# Patient Record
Sex: MALE | Race: WHITE | Employment: FULL TIME | URBAN - METROPOLITAN AREA
[De-identification: names, ages, dates, MRNs, and addresses within clinical notes are randomized per-mention and may not be internally consistent; named-entity substitution may affect disease eponyms.]

---

## 2022-02-02 ENCOUNTER — OFFICE VISIT (OUTPATIENT)
Dept: FAMILY MEDICINE CLINIC | Age: 42
End: 2022-02-02
Payer: COMMERCIAL

## 2022-02-02 VITALS
TEMPERATURE: 97.8 F | SYSTOLIC BLOOD PRESSURE: 108 MMHG | WEIGHT: 240 LBS | DIASTOLIC BLOOD PRESSURE: 66 MMHG | HEIGHT: 73 IN | HEART RATE: 74 BPM | OXYGEN SATURATION: 95 % | BODY MASS INDEX: 31.81 KG/M2

## 2022-02-02 DIAGNOSIS — F41.9 ANXIETY: ICD-10-CM

## 2022-02-02 DIAGNOSIS — Z00.00 PREVENTATIVE HEALTH CARE: Primary | ICD-10-CM

## 2022-02-02 DIAGNOSIS — Z13.1 SCREENING FOR DIABETES MELLITUS: ICD-10-CM

## 2022-02-02 DIAGNOSIS — Z13.220 SCREENING, LIPID: ICD-10-CM

## 2022-02-02 PROCEDURE — 99386 PREV VISIT NEW AGE 40-64: CPT | Performed by: PHYSICIAN ASSISTANT

## 2022-02-02 RX ORDER — FLUOXETINE HYDROCHLORIDE 20 MG/1
CAPSULE ORAL
COMMUNITY
Start: 2022-02-01 | End: 2022-02-03 | Stop reason: SDUPTHER

## 2022-02-02 SDOH — ECONOMIC STABILITY: FOOD INSECURITY: WITHIN THE PAST 12 MONTHS, YOU WORRIED THAT YOUR FOOD WOULD RUN OUT BEFORE YOU GOT MONEY TO BUY MORE.: NEVER TRUE

## 2022-02-02 SDOH — ECONOMIC STABILITY: FOOD INSECURITY: WITHIN THE PAST 12 MONTHS, THE FOOD YOU BOUGHT JUST DIDN'T LAST AND YOU DIDN'T HAVE MONEY TO GET MORE.: NEVER TRUE

## 2022-02-02 ASSESSMENT — ENCOUNTER SYMPTOMS
DIARRHEA: 0
CHEST TIGHTNESS: 0
ABDOMINAL PAIN: 0
BACK PAIN: 0
CONSTIPATION: 0
EYE PAIN: 0
TROUBLE SWALLOWING: 0
COUGH: 0
SORE THROAT: 0
VOICE CHANGE: 0
SHORTNESS OF BREATH: 0

## 2022-02-02 ASSESSMENT — PATIENT HEALTH QUESTIONNAIRE - PHQ9
SUM OF ALL RESPONSES TO PHQ QUESTIONS 1-9: 0
2. FEELING DOWN, DEPRESSED OR HOPELESS: 0
SUM OF ALL RESPONSES TO PHQ QUESTIONS 1-9: 0
SUM OF ALL RESPONSES TO PHQ9 QUESTIONS 1 & 2: 0
DEPRESSION UNABLE TO ASSESS: FUNCTIONAL CAPACITY MOTIVATION LIMITS ACCURACY
1. LITTLE INTEREST OR PLEASURE IN DOING THINGS: 0
SUM OF ALL RESPONSES TO PHQ QUESTIONS 1-9: 0
SUM OF ALL RESPONSES TO PHQ QUESTIONS 1-9: 0

## 2022-02-02 ASSESSMENT — SOCIAL DETERMINANTS OF HEALTH (SDOH): HOW HARD IS IT FOR YOU TO PAY FOR THE VERY BASICS LIKE FOOD, HOUSING, MEDICAL CARE, AND HEATING?: NOT HARD AT ALL

## 2022-02-02 NOTE — PROGRESS NOTES
Subjective:      Patient ID: Mukesh Garrett is a 35 y.o. male. HPI Patient is here today for a new patient visit to establish care. His last doctor retired so he needed a new doctor. She retired in 2020. He takes Prozac 20mg daily for anxiety and has for 12 years. Works well for him and has no SE's. He has had 3 Covid vaccines. He had a flu shot. Last Tdap in 2017. He sleeps well. He has a good appetite. He exercises 6 days a week. He has no bowel/bladder issues. He has not had labs in a couple years. Review of Systems   Constitutional: Negative for appetite change, fatigue and unexpected weight change. HENT: Negative for congestion, dental problem, ear pain, hearing loss, sore throat, trouble swallowing and voice change. Eyes: Negative for pain and visual disturbance. Respiratory: Negative for cough, chest tightness and shortness of breath. Cardiovascular: Negative for chest pain and palpitations. Gastrointestinal: Negative for abdominal pain, constipation and diarrhea. Genitourinary: Negative for difficulty urinating. Musculoskeletal: Negative for arthralgias, back pain, myalgias and neck pain. Skin: Negative for rash. Neurological: Negative for dizziness, speech difficulty, weakness, numbness and headaches. Hematological: Negative for adenopathy. Psychiatric/Behavioral: Negative for confusion and sleep disturbance. The patient is not nervous/anxious. Objective:   Physical Exam  Vitals reviewed. Constitutional:       Appearance: Normal appearance. He is well-developed and well-groomed. HENT:      Head: Normocephalic. Right Ear: Tympanic membrane and ear canal normal.      Left Ear: Tympanic membrane and ear canal normal.      Nose: Nose normal.      Mouth/Throat:      Pharynx: Oropharynx is clear. Uvula midline. Eyes:      Conjunctiva/sclera: Conjunctivae normal.      Pupils: Pupils are equal, round, and reactive to light.    Neck:      Thyroid: No thyroid mass or thyromegaly. Trachea: Trachea normal.   Cardiovascular:      Rate and Rhythm: Normal rate and regular rhythm. Chest Wall: PMI is not displaced. Pulses: Normal pulses. Heart sounds: Normal heart sounds, S1 normal and S2 normal.   Pulmonary:      Effort: Pulmonary effort is normal.      Breath sounds: Normal breath sounds. Abdominal:      General: Abdomen is flat. Bowel sounds are normal.      Palpations: Abdomen is soft. Tenderness: There is no abdominal tenderness. There is no guarding or rebound. Hernia: No hernia is present. Musculoskeletal:      Cervical back: Normal range of motion and neck supple. Thoracic back: Normal.      Lumbar back: Normal.      Right hip: Normal.      Left hip: Normal.      Comments: Full ROM and strength of torso and extremities, gait normal   Lymphadenopathy:      Cervical: No cervical adenopathy. Skin:     General: Skin is warm and dry. Findings: No rash. Neurological:      Mental Status: He is alert and oriented to person, place, and time. Cranial Nerves: No cranial nerve deficit. Sensory: No sensory deficit. Gait: Gait normal.   Psychiatric:         Attention and Perception: Attention normal.         Mood and Affect: Mood normal.         Speech: Speech normal.         Behavior: Behavior normal. Behavior is cooperative. Assessment:      Filemon Quintanilla was seen today for new patient. Diagnoses and all orders for this visit:    Preventative health care    Anxiety    Screening, lipid  -     LIPID PANEL; Future    Screening for diabetes mellitus  -     Comprehensive Metabolic Panel             Plan:      Get routine labs, stable anxiety, return here in a year or sooner if needed.          Rufina Merlin, [place]

## 2022-02-02 NOTE — PATIENT INSTRUCTIONS
Thank you for enrolling in [address] Please follow the instructions below to securely access your online medical record. [employer] allows you to send messages to your doctor, view your test results, renew your prescriptions, schedule appointments, and more. How Do I Sign Up? 1. In your Internet browser, go to [email or web address]  2. Click on the Sign Up Now link in the Sign In box. You will see the New Member Sign Up page. 3. Enter your [employer] Access Code exactly as it appears below. You will not need to use this code after youve completed the sign-up process. If you do not sign up before the expiration date, you must request a new code. [employer] Access Code: [email or web address]  Expires: 3/18/2022 11:08 AM    4. Enter your Social Security Number ([id number]) and Date of Birth (mm/dd/yyyy) as indicated and click Submit. You will be taken to the next sign-up page. 5. Create a [employer] ID. This will be your [employer] login ID and cannot be changed, so think of one that is secure and easy to remember. 6. Create a [employer] password. You can change your password at any time. 7. Enter your Password Reset Question and Answer. This can be used at a later time if you forget your password. 8. Enter your e-mail address. You will receive e-mail notification when new information is available in [address] 9. Click Sign Up. You can now view your medical record. Additional Information  If you have questions, please contact your physician practice where you receive care. Remember, [employer] is NOT to be used for urgent needs. For medical emergencies, dial 911. Martita Salamanca was seen today for new patient. Diagnoses and all orders for this visit:    Preventative health care    Anxiety    Screening, lipid  -     LIPID PANEL; Future    Screening for diabetes mellitus  -     Comprehensive Metabolic Panel       Get routine labs, return here in a year or sooner if needed.

## 2022-02-03 ENCOUNTER — PATIENT MESSAGE (OUTPATIENT)
Dept: FAMILY MEDICINE CLINIC | Age: 42
End: 2022-02-03

## 2022-02-03 DIAGNOSIS — F41.9 ANXIETY: Primary | ICD-10-CM

## 2022-02-03 RX ORDER — FLUOXETINE HYDROCHLORIDE 20 MG/1
20 CAPSULE ORAL DAILY
Qty: 90 CAPSULE | Refills: 3 | Status: SHIPPED | OUTPATIENT
Start: 2022-02-03

## 2022-02-24 ENCOUNTER — TELEPHONE (OUTPATIENT)
Dept: FAMILY MEDICINE CLINIC | Age: 42
End: 2022-02-24

## 2022-02-24 NOTE — TELEPHONE ENCOUNTER
Patient is calling because he is going to get his labs done and he wants to know if he can get a test for his his thyroid ordered and also the pre diabetes test ordered. He would like a call when that is ordered.       Please advise       Provider out of the office

## 2022-02-25 ENCOUNTER — TELEPHONE (OUTPATIENT)
Dept: FAMILY MEDICINE CLINIC | Age: 42
End: 2022-02-25

## 2022-02-25 ENCOUNTER — HOSPITAL ENCOUNTER (OUTPATIENT)
Age: 42
Discharge: HOME | End: 2022-02-25
Payer: COMMERCIAL

## 2022-02-25 DIAGNOSIS — I10 HYPERTENSION, UNSPECIFIED TYPE: ICD-10-CM

## 2022-02-25 DIAGNOSIS — R79.89 ABNORMAL THYROID BLOOD TEST: ICD-10-CM

## 2022-02-25 DIAGNOSIS — R79.89 ABNORMAL THYROID BLOOD TEST: Primary | ICD-10-CM

## 2022-02-25 DIAGNOSIS — Z13.220 SCREENING, LIPID: ICD-10-CM

## 2022-02-25 LAB
A/G RATIO: 2.1 (ref 1.1–2.2)
ALBUMIN SERPL-MCNC: 4.5 G/DL (ref 3.4–5)
ALP BLD-CCNC: 52 U/L (ref 40–129)
ALT SERPL-CCNC: 20 U/L (ref 10–40)
ANION GAP SERPL CALCULATED.3IONS-SCNC: 9 MMOL/L (ref 3–16)
AST SERPL-CCNC: 21 U/L (ref 15–37)
BASOPHILS ABSOLUTE: 0 K/UL (ref 0–0.2)
BASOPHILS RELATIVE PERCENT: 0.5 %
BILIRUB SERPL-MCNC: 1.2 MG/DL (ref 0–1)
BUN BLDV-MCNC: 18 MG/DL (ref 7–20)
CALCIUM SERPL-MCNC: 9.8 MG/DL (ref 8.3–10.6)
CHLORIDE BLD-SCNC: 102 MMOL/L (ref 99–110)
CHOLESTEROL, TOTAL: 251 MG/DL (ref 0–199)
CO2: 27 MMOL/L (ref 21–32)
CREAT SERPL-MCNC: 1.2 MG/DL (ref 0.9–1.3)
EOSINOPHILS ABSOLUTE: 0.1 K/UL (ref 0–0.6)
EOSINOPHILS RELATIVE PERCENT: 2.5 %
GFR AFRICAN AMERICAN: >60
GFR NON-AFRICAN AMERICAN: >60
GLUCOSE BLD-MCNC: 87 MG/DL (ref 70–99)
HCT VFR BLD CALC: 46.3 % (ref 40.5–52.5)
HDLC SERPL-MCNC: 40 MG/DL (ref 40–60)
HEMOGLOBIN: 16 G/DL (ref 13.5–17.5)
LDL CHOLESTEROL CALCULATED: 187 MG/DL
LYMPHOCYTES ABSOLUTE: 1.7 K/UL (ref 1–5.1)
LYMPHOCYTES RELATIVE PERCENT: 33 %
MCH RBC QN AUTO: 29.2 PG (ref 26–34)
MCHC RBC AUTO-ENTMCNC: 34.4 G/DL (ref 31–36)
MCV RBC AUTO: 84.7 FL (ref 80–100)
MONOCYTES ABSOLUTE: 0.3 K/UL (ref 0–1.3)
MONOCYTES RELATIVE PERCENT: 6.6 %
NEUTROPHILS ABSOLUTE: 2.9 K/UL (ref 1.7–7.7)
NEUTROPHILS RELATIVE PERCENT: 57.4 %
PDW BLD-RTO: 13.4 % (ref 12.4–15.4)
PLATELET # BLD: 253 K/UL (ref 135–450)
PMV BLD AUTO: 7.3 FL (ref 5–10.5)
POTASSIUM SERPL-SCNC: 4.6 MMOL/L (ref 3.5–5.1)
RBC # BLD: 5.47 M/UL (ref 4.2–5.9)
SODIUM BLD-SCNC: 138 MMOL/L (ref 136–145)
TOTAL PROTEIN: 6.6 G/DL (ref 6.4–8.2)
TRIGL SERPL-MCNC: 120 MG/DL (ref 0–150)
TSH REFLEX: 1.87 UIU/ML (ref 0.27–4.2)
VLDLC SERPL CALC-MCNC: 24 MG/DL
WBC # BLD: 5.1 K/UL (ref 4–11)

## 2022-02-25 PROCEDURE — 80053 COMPREHEN METABOLIC PANEL: CPT

## 2022-02-25 PROCEDURE — 80061 LIPID PANEL: CPT

## 2022-02-25 PROCEDURE — 84443 ASSAY THYROID STIM HORMONE: CPT

## 2022-02-25 PROCEDURE — 85025 COMPLETE CBC W/AUTO DIFF WBC: CPT

## 2022-02-25 PROCEDURE — 36415 COLL VENOUS BLD VENIPUNCTURE: CPT

## 2022-03-01 RX ORDER — ATORVASTATIN CALCIUM 20 MG/1
20 TABLET, FILM COATED ORAL DAILY
Qty: 90 TABLET | Refills: 1 | Status: SHIPPED | OUTPATIENT
Start: 2022-03-01 | End: 2022-08-01 | Stop reason: SDUPTHER

## 2022-07-06 ENCOUNTER — TELEPHONE (OUTPATIENT)
Dept: FAMILY MEDICINE CLINIC | Age: 42
End: 2022-07-06

## 2022-07-06 NOTE — TELEPHONE ENCOUNTER
Patient is calling stating that he has a few questions about testing positive for COVID on 06/21. He also needs a refill on this medication. atorvastatin (LIPITOR) 20 MG tablet 90 tablet 1 3/1/2022     Sig - Route:  Take 1 tablet by mouth daily - Oral      [facility] [address] in chart     [facility] patient     Asked for Jane Boone

## 2022-07-06 NOTE — TELEPHONE ENCOUNTER
Called and spoke with patient- states that he tested positive for Covid on June 21st- had aches, lost of taste and smell. Used an at home test. Patient is taking a cruise beginning of August and will be needing a negative COVID test to travel however he states that he can test positive for awhile after having it and was wondering if a letter could be written stating that he will be okay to travel. Advised patient that I would forward this to a provider. Please advise. Refill for atorvastatin not sent at this time. Per patient spilled about have a bottle of medication down the sink but has about two weeks left. Advised patient that insurance will not cover if it is refilled to early. Patient will call back when he is almost out to see if insurance will cover- did advise of good RX as well.

## 2022-07-06 NOTE — TELEPHONE ENCOUNTER
Made follow up phone call to patient. Did take home test that was positive with symptoms starting on 6/21. Will send picture confirmation via [employer] Advised patient to use saline rinses for 3 days then retest at home - if negative we can PCR test him here.   Otherwise I can write him a letter with confirmation of home test.

## 2022-08-01 ENCOUNTER — TELEPHONE (OUTPATIENT)
Dept: FAMILY MEDICINE CLINIC | Age: 42
End: 2022-08-01

## 2022-08-01 RX ORDER — ATORVASTATIN CALCIUM 20 MG/1
20 TABLET, FILM COATED ORAL DAILY
Qty: 90 TABLET | Refills: 0 | Status: SHIPPED | OUTPATIENT
Start: 2022-08-01 | End: 2022-10-31

## 2022-10-31 RX ORDER — ATORVASTATIN CALCIUM 20 MG/1
TABLET, FILM COATED ORAL
Qty: 90 TABLET | Refills: 0 | Status: SHIPPED | OUTPATIENT
Start: 2022-10-31

## 2022-10-31 NOTE — TELEPHONE ENCOUNTER
Former AJ pt         Medication:   Requested Prescriptions     Pending Prescriptions Disp Refills    atorvastatin (LIPITOR) 20 MG tablet [Pharmacy Med Name: ATORVASTATIN 20 MG TABLET] 90 tablet 0     Sig: TAKE 1 TABLET BY MOUTH EVERY DAY      Last Filled:      Patient Phone Number: [phone] (home)     Last appt: 2/2/2022   Next appt: Visit date not found    Last OARRS: No flowsheet data found.   PDMP Monitoring:    Last PDMP Tara nowak Reviewed [facility]):  Review User Review Instant Review Result          Preferred Pharmacy:   Mayra Baez [address] [place] [address] [phone]  [address] [place] [address]  Phone: [phone] Fax: [phone]

## 2023-01-30 RX ORDER — ATORVASTATIN CALCIUM 20 MG/1
TABLET, FILM COATED ORAL
Qty: 30 TABLET | Refills: 0 | OUTPATIENT
Start: 2023-01-30

## 2023-01-30 RX ORDER — ATORVASTATIN CALCIUM 20 MG/1
TABLET, FILM COATED ORAL
Qty: 90 TABLET | Refills: 0 | OUTPATIENT
Start: 2023-01-30

## 2023-01-30 NOTE — TELEPHONE ENCOUNTER
Medication:   Requested Prescriptions     Pending Prescriptions Disp Refills    atorvastatin (LIPITOR) 20 MG tablet [Pharmacy Med Name: ATORVASTATIN 20 MG TABLET] 30 tablet 0     Sig: TAKE 1 TABLET BY MOUTH EVERY DAY      Last Filled:      Patient Phone Number: [phone] (home)     Last appt: 2/2/2022   Next appt: Visit date not found    Last OARRS: No flowsheet data found.   PDMP Monitoring:    Last PDMP John nowak Reviewed [facility]):  Review User Review Instant Review Result          Preferred Pharmacy:   [facility]aez [phone]  [address] [place] [id number]  Phone: [phone] Fax: [phone]

## 2023-02-07 RX ORDER — ATORVASTATIN CALCIUM 20 MG/1
20 TABLET, FILM COATED ORAL DAILY
Qty: 30 TABLET | Refills: 0 | Status: SHIPPED | OUTPATIENT
Start: 2023-02-07

## 2023-02-15 DIAGNOSIS — F41.9 ANXIETY: ICD-10-CM

## 2023-02-16 ENCOUNTER — OFFICE VISIT (OUTPATIENT)
Dept: FAMILY MEDICINE CLINIC | Age: 43
End: 2023-02-16
Payer: COMMERCIAL

## 2023-02-16 VITALS
OXYGEN SATURATION: 98 % | HEART RATE: 76 BPM | DIASTOLIC BLOOD PRESSURE: 82 MMHG | SYSTOLIC BLOOD PRESSURE: 118 MMHG | WEIGHT: 245 LBS | TEMPERATURE: 97.3 F | BODY MASS INDEX: 32.32 KG/M2

## 2023-02-16 DIAGNOSIS — Z00.00 ANNUAL PHYSICAL EXAM: Primary | ICD-10-CM

## 2023-02-16 DIAGNOSIS — E78.2 MIXED HYPERLIPIDEMIA: ICD-10-CM

## 2023-02-16 DIAGNOSIS — F41.9 ANXIETY: ICD-10-CM

## 2023-02-16 PROCEDURE — 99396 PREV VISIT EST AGE 40-64: CPT | Performed by: NURSE PRACTITIONER

## 2023-02-16 RX ORDER — FLUOXETINE HYDROCHLORIDE 20 MG/1
CAPSULE ORAL
Qty: 90 CAPSULE | Refills: 3 | OUTPATIENT
Start: 2023-02-16

## 2023-02-16 RX ORDER — FLUOXETINE HYDROCHLORIDE 20 MG/1
20 CAPSULE ORAL DAILY
Qty: 90 CAPSULE | Refills: 3 | Status: SHIPPED | OUTPATIENT
Start: 2023-02-16

## 2023-02-16 SDOH — HEALTH STABILITY: PHYSICAL HEALTH: ON AVERAGE, HOW MANY DAYS PER WEEK DO YOU ENGAGE IN MODERATE TO STRENUOUS EXERCISE (LIKE A BRISK WALK)?: 6 DAYS

## 2023-02-16 SDOH — HEALTH STABILITY: PHYSICAL HEALTH: ON AVERAGE, HOW MANY MINUTES DO YOU ENGAGE IN EXERCISE AT THIS LEVEL?: 90 MIN

## 2023-02-16 SDOH — ECONOMIC STABILITY: INCOME INSECURITY: HOW HARD IS IT FOR YOU TO PAY FOR THE VERY BASICS LIKE FOOD, HOUSING, MEDICAL CARE, AND HEATING?: NOT HARD AT ALL

## 2023-02-16 SDOH — ECONOMIC STABILITY: FOOD INSECURITY: WITHIN THE PAST 12 MONTHS, THE FOOD YOU BOUGHT JUST DIDN'T LAST AND YOU DIDN'T HAVE MONEY TO GET MORE.: NEVER TRUE

## 2023-02-16 SDOH — ECONOMIC STABILITY: HOUSING INSECURITY
IN THE LAST 12 MONTHS, WAS THERE A TIME WHEN YOU DID NOT HAVE A STEADY PLACE TO SLEEP OR SLEPT IN A SHELTER (INCLUDING NOW)?: NO

## 2023-02-16 SDOH — ECONOMIC STABILITY: FOOD INSECURITY: WITHIN THE PAST 12 MONTHS, YOU WORRIED THAT YOUR FOOD WOULD RUN OUT BEFORE YOU GOT MONEY TO BUY MORE.: NEVER TRUE

## 2023-02-16 ASSESSMENT — PATIENT HEALTH QUESTIONNAIRE - PHQ9
SUM OF ALL RESPONSES TO PHQ QUESTIONS 1-9: 0
SUM OF ALL RESPONSES TO PHQ9 QUESTIONS 1 & 2: 0
2. FEELING DOWN, DEPRESSED OR HOPELESS: 0
SUM OF ALL RESPONSES TO PHQ QUESTIONS 1-9: 0
SUM OF ALL RESPONSES TO PHQ QUESTIONS 1-9: 0
1. LITTLE INTEREST OR PLEASURE IN DOING THINGS: 0
SUM OF ALL RESPONSES TO PHQ QUESTIONS 1-9: 0

## 2023-02-16 NOTE — PROGRESS NOTES
History and Physical      Kendrick Summers  YOB: 1980    Date of Service:  2/16/2023    Chief Complaint:   Kendrick Summers is a 38 y.o. male who presents for complete physical examination. HPI: Presents to clinic today for annual physical exam and follow up on chronic health conditions. HLD  Started on Lipitor last year. Tolerating well. No side effects. Father, maternal uncle treated for high cholesterol. Paternal grandfather - MI/Stroke - unsure of age, was also treated for cholesterol - believes his first coronary event was in his 62s - he was generally unhealthy - smoker/drinker. Anxiety  Stable. Compliant with medications. Denies any side effects. Hemorrhoids  Does still have some rectal bleeding on occasion. Has had internal hemorrhoids banded. Diet: Good - eats fruits and vegetables daily. Does try to limit junk and sugar. Struggles with portion control. Exercise: 6 times per week - cardio/weights  Occupation: [employer] -  - full time, likes job, likes co-workers in general.   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 Family life: , 2 children, no pets, lives in house. Medium stress - home stress. No concerns for anxiety/depression. Last eye exam: 2019 - Lasix. Last dental exam: 2023    Preventive Care:  Health Maintenance   Topic Date Due    HIV screen  Never done    Hepatitis C screen  Never done    COVID-19 Vaccine (3 - Booster for Pfizer series) 12/28/2021    Flu vaccine (1) 08/01/2022    Depression Screen  02/02/2023    Lipids  02/25/2023    DTaP/Tdap/Td vaccine (3 - Td or Tdap) 07/21/2027    Hepatitis A vaccine  Aged Out    Hib vaccine  Aged Out    Meningococcal (ACWY) vaccine  Aged Out    Pneumococcal 0-64 years Vaccine  Aged Out        Family History:  Colon Cancer: None  Thyroid Cancer/Disease: None  Melanoma: Maternal grandfather - unsure of age of dx.    Prostate Cancer: None  Testicular Cancer: None       Preventive plan of care for Eula Hernandez        2/16/2023           Preventive Measures Status       Recommendations for screening   Prostate Cancer Screen  No results found for: PSA   This test is not clinically indicated    Colon Cancer Screen  Last colonoscopy: N/A This test is not clinically indicated   Diabetes Screen  Glucose (mg/dL)   Date Value   02/25/2022 87    Repeat yearly   Cholesterol Screen  Lab Results   Component Value Date    CHOL 251 (H) 02/25/2022    TRIG 120 02/25/2022    HDL 40 02/25/2022    LDLCALC 187 (H) 02/25/2022    Repeat yearly   Aspirin for Cardiovascular Prevention   No Not indicated   Weight: Body mass index is 32.32 kg/m². 245 lb (111.1 kg)  Your BMI is 25 or greater, which indicates that you are overweight   Living Will: Yes Copy requested    Recommended Immunizations    Immunization History   Administered Date(s) Administered    COVID-19, PFIZER PURPLE top, DILUTE for use, (age 15 y+), 30mcg/0.3mL 03/20/2021, 04/13/2021, 11/02/2021    Influenza vaccine:  recommended every fall              Wt Readings from Last 3 Encounters:   02/16/23 245 lb (111.1 kg)   02/02/22 240 lb (108.9 kg)     BP Readings from Last 3 Encounters:   02/16/23 118/82   02/02/22 108/66       Patient Active Problem List   Diagnosis    Anxiety       Allergies   Allergen Reactions    Aminobenzoate Rash    Macadamia Nut Oil Rash     Outpatient Medications Marked as Taking for the 2/16/23 encounter (Office Visit) with ROSALINDA Mccabe CNP   Medication Sig Dispense Refill    FLUoxetine (PROZAC) 20 MG capsule Take 1 capsule by mouth daily 90 capsule 3    atorvastatin (LIPITOR) 20 MG tablet Take 1 tablet by mouth daily 30 tablet 0       No past medical history on file.   Past Surgical History:   Procedure Laterality Date    EYE SURGERY      lasik     Family History   Problem Relation Age of Onset    Diabetes Maternal Grandmother     Cancer Paternal Grandmother         lung-smoker    Heart Disease Paternal Grandfather     High Blood Pressure Paternal Grandfather      Social History     Socioeconomic History    Marital status:      Spouse name: Not on file    Number of children: Not on file    Years of education: Not on file    Highest education level: Not on file   Occupational History    Not on file   Tobacco Use    Smoking status: Never    Smokeless tobacco: Never   Substance and Sexual Activity    Alcohol use: Yes     Alcohol/week: 2.0 standard drinks     Types: 2 Cans of beer per week     Comment: social    Drug use: Never    Sexual activity: Not on file   Other Topics Concern    Not on file   Social History Narrative    Not on file     Social Determinants of Health     Financial Resource Strain: Low Risk     Difficulty of Paying Living Expenses: Not hard at all   Food Insecurity: No Food Insecurity    Worried About [address] in the Last Year: Never true    [address] [employer] in the Last Year: Never true   Transportation Needs: Unknown    Lack of Transportation (Medical): Not on file    Lack of Transportation (Non-Medical): No   Physical Activity: Sufficiently Active    Days of Exercise per Week: 6 days    Minutes of Exercise per Session: 90 min   Stress: Not on file   Social Connections: Not on file   Intimate Partner Violence: Not At Risk    Fear of Current or Ex-Partner: No    Emotionally Abused: No    Physically Abused: No    Sexually Abused: No   Housing Stability: Unknown    Unable to Pay for Housing in the Last Year: Not on file    Number of Places Lived in the Last Year: Not on file    Unstable Housing in the Last Year: No     Review of Systems:  A comprehensive review of systems was negative except for what was noted in the HPI. Physical Exam:   Vitals:    02/16/23 1337   BP: 118/82   Site: Left Upper Arm   Position: Sitting   Cuff Size: Large Adult   Pulse: 76   Temp: 97.3 °F (36.3 °C)   SpO2: 98%   Weight: 245 lb (111.1 kg)     Body mass index is 32.32 kg/m².    Physical Exam  Constitutional:       General: He is not in acute distress. Appearance: Normal appearance. He is well-developed. HENT:      Head: Normocephalic and atraumatic. Right Ear: Hearing, tympanic membrane, ear canal and external ear normal.      Left Ear: Hearing, tympanic membrane, ear canal and external ear normal.      Nose: Nose normal. No mucosal edema. Mouth/Throat:      Pharynx: Uvula midline. Tonsils: No tonsillar exudate. 1+ on the right. 1+ on the left. Eyes:      General: Lids are normal.         Right eye: No discharge. Left eye: No discharge. Conjunctiva/sclera: Conjunctivae normal.      Pupils: Pupils are equal, round, and reactive to light. Neck:      Thyroid: No thyromegaly. Trachea: Trachea normal. No tracheal deviation. Cardiovascular:      Rate and Rhythm: Normal rate and regular rhythm. Heart sounds: Normal heart sounds, S1 normal and S2 normal.   Pulmonary:      Effort: Pulmonary effort is normal. No respiratory distress. Breath sounds: Normal breath sounds. Abdominal:      General: Bowel sounds are normal. There is no distension. Palpations: Abdomen is soft. Tenderness: There is no abdominal tenderness. There is no guarding. Musculoskeletal:         General: Normal range of motion. Cervical back: Normal range of motion. Lymphadenopathy:      Cervical: No cervical adenopathy. Skin:     General: Skin is warm and dry. Neurological:      Mental Status: He is alert and oriented to person, place, and time. Psychiatric:         Thought Content: Thought content normal.         Judgment: Judgment normal.      Assessment/Plan:    Other Recommendations   See a dentist every 6 months  Try to get at least 30 minutes of exercise 3-4 days per week  Always wear a seat belt when traveling in a car       1. Annual physical exam  Encouraged continued healthy diet and regular exercise. Due for routine labs. Follow up in 1 year for annual exam - sooner if needed.    - Comprehensive Metabolic Panel, Fasting; Future  - Lipid, Fasting; Future    2. Anxiety  Stable. Continue on current medication regimen. - Comprehensive Metabolic Panel, Fasting; Future  - FLUoxetine (PROZAC) 20 MG capsule; Take 1 capsule by mouth daily  Dispense: 90 capsule; Refill: 3    3. Mixed hyperlipidemia  Complete routine blood work. Will adjust treatment plan if needed. - Lipid, Fasting; Future  Return in about 1 year (around 2/16/2024) for annual exam, chronic health conditions.

## 2023-02-22 ENCOUNTER — HOSPITAL ENCOUNTER (OUTPATIENT)
Age: 43
Discharge: HOME | End: 2023-02-22
Payer: COMMERCIAL

## 2023-02-22 ENCOUNTER — PATIENT MESSAGE (OUTPATIENT)
Dept: FAMILY MEDICINE CLINIC | Age: 43
End: 2023-02-22

## 2023-02-22 DIAGNOSIS — F41.9 ANXIETY: ICD-10-CM

## 2023-02-22 DIAGNOSIS — Z00.00 ANNUAL PHYSICAL EXAM: ICD-10-CM

## 2023-02-22 DIAGNOSIS — E78.2 MIXED HYPERLIPIDEMIA: ICD-10-CM

## 2023-02-22 LAB
A/G RATIO: 1.8 (ref 1.1–2.2)
ALBUMIN SERPL-MCNC: 4.5 G/DL (ref 3.4–5)
ALP BLD-CCNC: 65 U/L (ref 40–129)
ALT SERPL-CCNC: 33 U/L (ref 10–40)
ANION GAP SERPL CALCULATED.3IONS-SCNC: 7 MMOL/L (ref 3–16)
AST SERPL-CCNC: 26 U/L (ref 15–37)
BILIRUB SERPL-MCNC: 0.8 MG/DL (ref 0–1)
BUN BLDV-MCNC: 20 MG/DL (ref 7–20)
CALCIUM SERPL-MCNC: 9.6 MG/DL (ref 8.3–10.6)
CHLORIDE BLD-SCNC: 106 MMOL/L (ref 99–110)
CHOLESTEROL, FASTING: 157 MG/DL (ref 0–199)
CO2: 27 MMOL/L (ref 21–32)
CREAT SERPL-MCNC: 1.1 MG/DL (ref 0.9–1.3)
GFR SERPL CREATININE-BSD FRML MDRD: >60 ML/MIN/{1.73_M2}
GLUCOSE FASTING: 99 MG/DL (ref 70–99)
HDLC SERPL-MCNC: 44 MG/DL (ref 40–60)
LDL CHOLESTEROL CALCULATED: 94 MG/DL
POTASSIUM SERPL-SCNC: 4.8 MMOL/L (ref 3.5–5.1)
SODIUM BLD-SCNC: 140 MMOL/L (ref 136–145)
TOTAL PROTEIN: 7 G/DL (ref 6.4–8.2)
TRIGLYCERIDE, FASTING: 95 MG/DL (ref 0–150)
VLDLC SERPL CALC-MCNC: 19 MG/DL

## 2023-02-22 PROCEDURE — 80061 LIPID PANEL: CPT

## 2023-02-22 PROCEDURE — 36415 COLL VENOUS BLD VENIPUNCTURE: CPT

## 2023-02-22 PROCEDURE — 80053 COMPREHEN METABOLIC PANEL: CPT

## 2023-02-22 NOTE — TELEPHONE ENCOUNTER
Mignon Simmons 2/22/2023 9:49 AM EST      ----- Message -----  From: marlene Madrigalscooter  Sent: 2/22/2023 9:37 AM EST  To: [facility] Clinical Staff  Subject: Blood work     I did the blood work at the hospital this morning. How long do you think before the results come back? Just curious. Thanks!     Ayden Gallo

## 2023-03-07 RX ORDER — ATORVASTATIN CALCIUM 20 MG/1
TABLET, FILM COATED ORAL
Qty: 90 TABLET | Refills: 1 | Status: SHIPPED | OUTPATIENT
Start: 2023-03-07

## 2023-08-24 ENCOUNTER — OFFICE VISIT (OUTPATIENT)
Dept: FAMILY MEDICINE CLINIC | Age: 43
End: 2023-08-24
Payer: COMMERCIAL

## 2023-08-24 VITALS — TEMPERATURE: 97.5 F | HEART RATE: 82 BPM | OXYGEN SATURATION: 98 %

## 2023-08-24 DIAGNOSIS — J01.00 ACUTE NON-RECURRENT MAXILLARY SINUSITIS: Primary | ICD-10-CM

## 2023-08-24 PROCEDURE — 99213 OFFICE O/P EST LOW 20 MIN: CPT | Performed by: NURSE PRACTITIONER

## 2023-08-24 RX ORDER — AMOXICILLIN AND CLAVULANATE POTASSIUM 875; 125 MG/1; MG/1
1 TABLET, FILM COATED ORAL 2 TIMES DAILY
Qty: 20 TABLET | Refills: 0 | Status: SHIPPED | OUTPATIENT
Start: 2023-08-24 | End: 2023-09-03

## 2023-08-29 RX ORDER — ATORVASTATIN CALCIUM 20 MG/1
TABLET, FILM COATED ORAL
Qty: 90 TABLET | Refills: 1 | Status: SHIPPED | OUTPATIENT
Start: 2023-08-29

## 2023-11-01 ENCOUNTER — OFFICE VISIT (OUTPATIENT)
Dept: FAMILY MEDICINE CLINIC | Age: 43
End: 2023-11-01
Payer: COMMERCIAL

## 2023-11-01 ENCOUNTER — HOSPITAL ENCOUNTER (OUTPATIENT)
Age: 43
Discharge: HOME | End: 2023-11-01
Payer: COMMERCIAL

## 2023-11-01 VITALS
BODY MASS INDEX: 32.46 KG/M2 | DIASTOLIC BLOOD PRESSURE: 82 MMHG | HEART RATE: 93 BPM | OXYGEN SATURATION: 97 % | WEIGHT: 246 LBS | SYSTOLIC BLOOD PRESSURE: 120 MMHG

## 2023-11-01 DIAGNOSIS — R53.83 FATIGUE, UNSPECIFIED TYPE: ICD-10-CM

## 2023-11-01 DIAGNOSIS — F41.9 ANXIETY: ICD-10-CM

## 2023-11-01 DIAGNOSIS — Z23 NEED FOR VACCINATION: ICD-10-CM

## 2023-11-01 DIAGNOSIS — F32.9 REACTIVE DEPRESSION: Primary | ICD-10-CM

## 2023-11-01 LAB
25(OH)D3 SERPL-MCNC: 36.5 NG/ML
BASOPHILS # BLD: 0 K/UL (ref 0–0.2)
BASOPHILS NFR BLD: 0.4 %
DEPRECATED RDW RBC AUTO: 13.7 % (ref 12.4–15.4)
EOSINOPHIL # BLD: 0.1 K/UL (ref 0–0.6)
EOSINOPHIL NFR BLD: 1.2 %
FERRITIN SERPL IA-MCNC: 193.5 NG/ML (ref 30–400)
FOLATE SERPL-MCNC: >20 NG/ML (ref 4.78–24.2)
HCT VFR BLD AUTO: 45.4 % (ref 40.5–52.5)
HGB BLD-MCNC: 15.7 G/DL (ref 13.5–17.5)
IRON SATN MFR SERPL: 36 % (ref 20–50)
IRON SERPL-MCNC: 108 UG/DL (ref 59–158)
LYMPHOCYTES # BLD: 1.9 K/UL (ref 1–5.1)
LYMPHOCYTES NFR BLD: 31.5 %
MCH RBC QN AUTO: 29.6 PG (ref 26–34)
MCHC RBC AUTO-ENTMCNC: 34.7 G/DL (ref 31–36)
MCV RBC AUTO: 85.3 FL (ref 80–100)
MONOCYTES # BLD: 0.4 K/UL (ref 0–1.3)
MONOCYTES NFR BLD: 6.4 %
NEUTROPHILS # BLD: 3.7 K/UL (ref 1.7–7.7)
NEUTROPHILS NFR BLD: 60.5 %
PLATELET # BLD AUTO: 245 K/UL (ref 135–450)
PMV BLD AUTO: 7.3 FL (ref 5–10.5)
RBC # BLD AUTO: 5.32 M/UL (ref 4.2–5.9)
TIBC SERPL-MCNC: 297 UG/DL (ref 260–445)
TSH SERPL DL<=0.005 MIU/L-ACNC: 2.7 UIU/ML (ref 0.27–4.2)
VIT B12 SERPL-MCNC: 599 PG/ML (ref 211–911)
WBC # BLD AUTO: 6.1 K/UL (ref 4–11)

## 2023-11-01 PROCEDURE — 36415 COLL VENOUS BLD VENIPUNCTURE: CPT

## 2023-11-01 PROCEDURE — 82306 VITAMIN D 25 HYDROXY: CPT

## 2023-11-01 PROCEDURE — 84443 ASSAY THYROID STIM HORMONE: CPT

## 2023-11-01 PROCEDURE — 85025 COMPLETE CBC W/AUTO DIFF WBC: CPT

## 2023-11-01 PROCEDURE — 82728 ASSAY OF FERRITIN: CPT

## 2023-11-01 PROCEDURE — 82746 ASSAY OF FOLIC ACID SERUM: CPT

## 2023-11-01 PROCEDURE — 84403 ASSAY OF TOTAL TESTOSTERONE: CPT

## 2023-11-01 PROCEDURE — 90674 CCIIV4 VAC NO PRSV 0.5 ML IM: CPT | Performed by: NURSE PRACTITIONER

## 2023-11-01 PROCEDURE — 99214 OFFICE O/P EST MOD 30 MIN: CPT | Performed by: NURSE PRACTITIONER

## 2023-11-01 PROCEDURE — 83550 IRON BINDING TEST: CPT

## 2023-11-01 PROCEDURE — 82607 VITAMIN B-12: CPT

## 2023-11-01 PROCEDURE — 83540 ASSAY OF IRON: CPT

## 2023-11-01 PROCEDURE — 90471 IMMUNIZATION ADMIN: CPT | Performed by: NURSE PRACTITIONER

## 2023-11-01 PROCEDURE — 84270 ASSAY OF SEX HORMONE GLOBUL: CPT

## 2023-11-01 RX ORDER — HYDROXYZINE PAMOATE 25 MG/1
25 CAPSULE ORAL 3 TIMES DAILY PRN
Qty: 60 CAPSULE | Refills: 0 | Status: SHIPPED | OUTPATIENT
Start: 2023-11-01 | End: 2023-12-01

## 2023-11-01 ASSESSMENT — ENCOUNTER SYMPTOMS
VOMITING: 0
NAUSEA: 0
COUGH: 0
SHORTNESS OF BREATH: 0
DIARRHEA: 0

## 2023-11-01 ASSESSMENT — PATIENT HEALTH QUESTIONNAIRE - PHQ9
SUM OF ALL RESPONSES TO PHQ QUESTIONS 1-9: 1
2. FEELING DOWN, DEPRESSED OR HOPELESS: 1
SUM OF ALL RESPONSES TO PHQ9 QUESTIONS 1 & 2: 1
SUM OF ALL RESPONSES TO PHQ QUESTIONS 1-9: 1
1. LITTLE INTEREST OR PLEASURE IN DOING THINGS: 0
SUM OF ALL RESPONSES TO PHQ QUESTIONS 1-9: 1
SUM OF ALL RESPONSES TO PHQ QUESTIONS 1-9: 1

## 2023-11-01 NOTE — PROGRESS NOTES
from Last 3 Encounters:   11/01/23 120/82   02/16/23 118/82   02/02/22 108/66     Wt Readings from Last 3 Encounters:   11/01/23 111.6 kg (246 lb)   02/16/23 111.1 kg (245 lb)   02/02/22 108.9 kg (240 lb)     Physical Exam  Constitutional:       General: He is not in acute distress. Appearance: Normal appearance. Neurological:      Mental Status: He is alert and oriented to person, place, and time. Psychiatric:         Mood and Affect: Mood normal.         Speech: Speech normal.         Behavior: Behavior normal.       Assessment/Plan    1. Reactive depression  Increase fluoxetine to 40mg. Monitor for side effects. Will complete labs to ensure no underlying cause for mood lability. 2. Anxiety  Unstable. Trial PRN hydroxyzine. Complete labs. - Testosterone, free, total; Future  - TSH with Reflex; Future  - Vitamin D 25 Hydroxy; Future  - Vitamin B12 & Folate; Future  - Iron and TIBC; Future  - Ferritin; Future  - CBC with Auto Differential; Future  - hydrOXYzine pamoate (VISTARIL) 25 MG capsule; Take 1 capsule by mouth 3 times daily as needed for Anxiety  Dispense: 60 capsule; Refill: 0    3. Fatigue, unspecified type  Complete labs. Will make treatment plan based on results. - Testosterone, free, total; Future  - TSH with Reflex; Future  - Vitamin D 25 Hydroxy; Future  - Vitamin B12 & Folate; Future  - Iron and TIBC; Future  - Ferritin; Future  - CBC with Auto Differential; Future    4. Need for vaccination  - Influenza, FLUCELVAX, (age 10 mo+), IM, Preservative Free, 0.5 mL     Aleda Kenna was counseled regarding symptoms of current diagnosis, course and complications of disease if inadequately treated. Discussed side effects of medications, diagnosis, treatment options, and prognosis along with risks, benefits, complications, and alternatives of treatment including labs, imaging and other studies/treatment targets and goals.   He verbalized understanding of instructions and

## 2023-11-04 LAB
SHBG SERPL-SCNC: 38 NMOL/L (ref 11–80)
TESTOST FREE SERPL-MCNC: 140.5 PG/ML (ref 47–244)
TESTOST SERPL-MCNC: 688 NG/DL (ref 220–1000)

## 2024-02-26 DIAGNOSIS — F41.9 ANXIETY: ICD-10-CM

## 2024-02-26 RX ORDER — FLUOXETINE HYDROCHLORIDE 20 MG/1
CAPSULE ORAL DAILY
Qty: 90 CAPSULE | Refills: 0 | Status: SHIPPED | OUTPATIENT
Start: 2024-02-26 | End: 2024-03-08 | Stop reason: SDUPTHER

## 2024-02-26 RX ORDER — ATORVASTATIN CALCIUM 20 MG/1
TABLET, FILM COATED ORAL
Qty: 90 TABLET | Refills: 0 | Status: SHIPPED | OUTPATIENT
Start: 2024-02-26 | End: 2024-03-08 | Stop reason: SDUPTHER

## 2024-03-08 ENCOUNTER — OFFICE VISIT (OUTPATIENT)
Dept: FAMILY MEDICINE CLINIC | Age: 44
End: 2024-03-08
Payer: COMMERCIAL

## 2024-03-08 VITALS
DIASTOLIC BLOOD PRESSURE: 80 MMHG | BODY MASS INDEX: 32.07 KG/M2 | HEIGHT: 73 IN | TEMPERATURE: 97.5 F | OXYGEN SATURATION: 97 % | HEART RATE: 70 BPM | WEIGHT: 242 LBS | SYSTOLIC BLOOD PRESSURE: 112 MMHG

## 2024-03-08 DIAGNOSIS — F41.9 ANXIETY: ICD-10-CM

## 2024-03-08 DIAGNOSIS — N52.9 ERECTILE DYSFUNCTION, UNSPECIFIED ERECTILE DYSFUNCTION TYPE: ICD-10-CM

## 2024-03-08 DIAGNOSIS — Z00.00 ANNUAL PHYSICAL EXAM: Primary | ICD-10-CM

## 2024-03-08 DIAGNOSIS — Z13.1 SCREENING FOR DIABETES MELLITUS: ICD-10-CM

## 2024-03-08 DIAGNOSIS — Z12.11 SCREENING FOR MALIGNANT NEOPLASM OF COLON: ICD-10-CM

## 2024-03-08 DIAGNOSIS — E78.2 MIXED HYPERLIPIDEMIA: ICD-10-CM

## 2024-03-08 DIAGNOSIS — K64.9 HEMORRHOIDS, UNSPECIFIED HEMORRHOID TYPE: ICD-10-CM

## 2024-03-08 DIAGNOSIS — F32.9 REACTIVE DEPRESSION: ICD-10-CM

## 2024-03-08 PROCEDURE — 99396 PREV VISIT EST AGE 40-64: CPT | Performed by: NURSE PRACTITIONER

## 2024-03-08 RX ORDER — ATORVASTATIN CALCIUM 20 MG/1
20 TABLET, FILM COATED ORAL DAILY
Qty: 90 TABLET | Refills: 2 | Status: SHIPPED | OUTPATIENT
Start: 2024-03-08

## 2024-03-08 RX ORDER — FLUOXETINE HYDROCHLORIDE 20 MG/1
20 CAPSULE ORAL DAILY
Qty: 90 CAPSULE | Refills: 2 | Status: SHIPPED | OUTPATIENT
Start: 2024-03-08

## 2024-03-08 SDOH — ECONOMIC STABILITY: INCOME INSECURITY: HOW HARD IS IT FOR YOU TO PAY FOR THE VERY BASICS LIKE FOOD, HOUSING, MEDICAL CARE, AND HEATING?: NOT HARD AT ALL

## 2024-03-08 SDOH — ECONOMIC STABILITY: FOOD INSECURITY: WITHIN THE PAST 12 MONTHS, YOU WORRIED THAT YOUR FOOD WOULD RUN OUT BEFORE YOU GOT MONEY TO BUY MORE.: NEVER TRUE

## 2024-03-08 SDOH — ECONOMIC STABILITY: FOOD INSECURITY: WITHIN THE PAST 12 MONTHS, THE FOOD YOU BOUGHT JUST DIDN'T LAST AND YOU DIDN'T HAVE MONEY TO GET MORE.: NEVER TRUE

## 2024-03-08 ASSESSMENT — PATIENT HEALTH QUESTIONNAIRE - PHQ9
10. IF YOU CHECKED OFF ANY PROBLEMS, HOW DIFFICULT HAVE THESE PROBLEMS MADE IT FOR YOU TO DO YOUR WORK, TAKE CARE OF THINGS AT HOME, OR GET ALONG WITH OTHER PEOPLE: NOT DIFFICULT AT ALL
7. TROUBLE CONCENTRATING ON THINGS, SUCH AS READING THE NEWSPAPER OR WATCHING TELEVISION: 0
6. FEELING BAD ABOUT YOURSELF - OR THAT YOU ARE A FAILURE OR HAVE LET YOURSELF OR YOUR FAMILY DOWN: 0
8. MOVING OR SPEAKING SO SLOWLY THAT OTHER PEOPLE COULD HAVE NOTICED. OR THE OPPOSITE - BEING SO FIDGETY OR RESTLESS THAT YOU HAVE BEEN MOVING AROUND A LOT MORE THAN USUAL: NOT AT ALL
3. TROUBLE FALLING OR STAYING ASLEEP: 0
SUM OF ALL RESPONSES TO PHQ QUESTIONS 1-9: 0
SUM OF ALL RESPONSES TO PHQ QUESTIONS 1-9: 0
5. POOR APPETITE OR OVEREATING: NOT AT ALL
1. LITTLE INTEREST OR PLEASURE IN DOING THINGS: 0
6. FEELING BAD ABOUT YOURSELF - OR THAT YOU ARE A FAILURE OR HAVE LET YOURSELF OR YOUR FAMILY DOWN: NOT AT ALL
10. IF YOU CHECKED OFF ANY PROBLEMS, HOW DIFFICULT HAVE THESE PROBLEMS MADE IT FOR YOU TO DO YOUR WORK, TAKE CARE OF THINGS AT HOME, OR GET ALONG WITH OTHER PEOPLE: 0
8. MOVING OR SPEAKING SO SLOWLY THAT OTHER PEOPLE COULD HAVE NOTICED. OR THE OPPOSITE, BEING SO FIGETY OR RESTLESS THAT YOU HAVE BEEN MOVING AROUND A LOT MORE THAN USUAL: 0
2. FEELING DOWN, DEPRESSED OR HOPELESS: NOT AT ALL
4. FEELING TIRED OR HAVING LITTLE ENERGY: 0
9. THOUGHTS THAT YOU WOULD BE BETTER OFF DEAD, OR OF HURTING YOURSELF: NOT AT ALL
2. FEELING DOWN, DEPRESSED OR HOPELESS: 0
1. LITTLE INTEREST OR PLEASURE IN DOING THINGS: NOT AT ALL
4. FEELING TIRED OR HAVING LITTLE ENERGY: NOT AT ALL
SUM OF ALL RESPONSES TO PHQ QUESTIONS 1-9: 0
9. THOUGHTS THAT YOU WOULD BE BETTER OFF DEAD, OR OF HURTING YOURSELF: 0
SUM OF ALL RESPONSES TO PHQ QUESTIONS 1-9: 0
5. POOR APPETITE OR OVEREATING: 0
7. TROUBLE CONCENTRATING ON THINGS, SUCH AS READING THE NEWSPAPER OR WATCHING TELEVISION: NOT AT ALL
SUM OF ALL RESPONSES TO PHQ QUESTIONS 1-9: 0
SUM OF ALL RESPONSES TO PHQ9 QUESTIONS 1 & 2: 0
3. TROUBLE FALLING OR STAYING ASLEEP: NOT AT ALL

## 2024-03-08 NOTE — PROGRESS NOTES
History and Physical      Declan Rowe  YOB: 1980    Date of Service:  3/8/2024    Chief Complaint:   Declan Rowe is a 43 y.o. male who presents for complete physical examination.    HPI: Presents to clinic today for annual physical exam and follow up on chronic health conditions.    Anxiety/Depression  Generally has been doing well.  Wife did switch jobs and generally enjoying it general - is working 3 jobs, but she has enjoyed in general. Did not have to increase fluoxetine.  Did not have to use the hydroxyzine.   HLD  Due for routine labs. Father, maternal uncle treated for high cholesterol.  Paternal grandfather - MI/Stroke - unsure of age, was also treated for cholesterol - believes his first coronary event was in his 60s - he was generally unhealthy - smoker/drinker.   Hemorrhoid  Did have his internal hemorrhoids     Had concerns in regards to some performance issues/erectile dysfunction - worked with [facility] and did trial Viagra which was helpful.  Minimal side effects.     Diet: Good - eats fruits and vegetables daily.  Does try to limit junk and sugar.  Struggles with portion control.   Exercise: 6 times per week - cardio/weights  Occupation: [employer] -  - full time, likes job, likes co-workers in general.   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   Family life: , 2 children, no pets, lives in house.  Medium stress - home stress. No concerns for anxiety/depression.   Last eye exam: 2019 - Lasix - generally doing well.    Last dental exam: 2023    Preventive Care:  Health Maintenance   Topic Date Due    Hepatitis B vaccine (1 of 3 - 3-dose series) Never done    HIV screen  Never done    Hepatitis C screen  Never done    Colorectal Cancer Screen  Never done    COVID-19 Vaccine (4 - 2023-24 season) 09/01/2023    Lipids  02/22/2024    Depression Monitoring  03/08/2025    DTaP/Tdap/Td vaccine (3 - Td or Tdap) 07/21/2027    Flu vaccine  Completed    Hepatitis A

## 2024-04-29 ENCOUNTER — PATIENT MESSAGE (OUTPATIENT)
Dept: FAMILY MEDICINE CLINIC | Age: 44
End: 2024-04-29

## 2024-04-29 DIAGNOSIS — R40.0 DAYTIME SLEEPINESS: ICD-10-CM

## 2024-04-29 DIAGNOSIS — Z12.11 SCREENING FOR MALIGNANT NEOPLASM OF COLON: ICD-10-CM

## 2024-04-29 DIAGNOSIS — R06.83 HABITUAL SNORING: Primary | ICD-10-CM

## 2024-05-02 NOTE — TELEPHONE ENCOUNTER
"[facility] VISIT      CONSULTING MD: Dr. Jakub Metcalf     CHIEF COMPLAINT:   1. Follow-up Toe walking         HISTORY OF PRESENT ILLNESS: Anatoly is a 3.5 y.o. male with a history of autism spectrum disorder who presents today in follow-up regarding toe walking. He was initially sent to me for consultation by Dr. Jakub Metcalf . He is here today with his father.  He was last seen on 24. Since his last visit, Anatoly 's mother (via phone) voiced concern re: the fit of his AFO's. There are periods of time that he will tolerate them and then others when he will complain that they are uncomfortable due to them being too tight. His parents are unsure whether he is ambulating better in/out of the AFO's. Mother denies any redness after removal of the AFO's     GESTATIONAL HISTORY:   Weeks born: full term  Delivery course:   Birth weight: 7lb 7oz  Nursery course: 3 days, discharged home without complications     DEVELOPMENTAL HISTORY:   Rolling: "on time"  Sitting: "on time"  Crawling: "on time"   Pull to stand: "on time"  Cruising: "on time"  Walking independent: 9 months  Pincer grasp: "on time"  1st words besides "Mama/Jose Rafael": "on time"  Stairs: "on time"  Running: "on time"        MOBILITY/TRANSFERS:  Rolling: Independent  Sitting: Independent  Crawling: Independent   Pull to Stand: Independent  Cruising: Independent  Walking: Independent, indefinite distances  Ascend Stairs: Independent, does not need hand rails    Descend Stairs: Independent, does not need hand rails    Bike: tricycle   Run: Independent   Jump: Independent  Kick: Independent, both legs  Hop: Independent, both legs     ACTIVITIES OF DAILY LIVING:  Upper extremity dressing: Independent  Lower extremity dressing: Independent  Bathe: ModA  Groom: ModA  Brush teeth: ModA  Toilet: Pull ups, working on potty training  Reach with purpose: Yes  Hand to Hand Transfer: Yes  Hand dominance: left  Scribble: " From: Marc Caballero  To: Unknown Gal[place]  Sent: 2/3/2022 7:45 AM EST  Subject: Prozac    Hi Lillian Trujillo,  It was a pleasure to meet you yesterday. I need to refill my Prozac prescription. Generic is great. Can you help me?     Jonh Guevara Yes  Draws Straight line: Yes  Draws a [place]: Yes  Draws a triangle: No  Draws a square: No  Letters/Name: Recognizes all letters, starting to work on writing  Buttons: independent   Zippers: independent   Ties: MaxA  Self feed: Independent with finger foods  Spoon/fork: Independent, doesn't use knife  Liquids: Open cup or straw  Stacks blocks: Indefinitely   Turns a page of a book: Yes     COMMUNICATION/COGNITION:  Number of words in vocabulary and sentences: Too many to count. 3 word sentences.   Points at objects of desire: Yes  Turns head to name: Yes  Follows 3 step commands.   Augmentative communication: None     THERAPY/LOCATION:  PT: have done 3 sessions since August 2023 for toe walking, provided with HEP for toe walking  OT: 1x week [facility]  Speech: 1x week [facility]     EDUCATION/VOCATION:  School: Day care  Individual Plan: No  Special Education: No  Grade level: N/a     RECREATION: None     EQUIPMENT:  Braces: None  Wheelchair: None  Stroller: None  Walker: None  Carseat: Yes        PAST MEDICAL HISTORY:  1. PCP - William Lipscomb MD   2. ENT-  Meena Quinn   3. Child development - Dr. Metcalf          Past Medical History:   Diagnosis Date    Foreign body of right ear      H/O chronic ear infection              PAST SURGICAL HISTORY:         Past Surgical History:   Procedure Laterality Date    MYRINGOTOMY WITH INSERTION OF VENTILATION TUBE Bilateral 8/17/2021     Procedure: MYRINGOTOMY, WITH TYMPANOSTOMY TUBE INSERTION;  Surgeon: Meena Quinn MD;  Location: [facility];  Service: ENT;  Laterality: Bilateral;    MYRINGOTOMY WITH REMOVAL OF TYMPANOSTOMY TUBE Bilateral 10/6/2023     Procedure: MYRINGOTOMY, WITH TYMPANOSTOMY TUBE REMOVAL;  Surgeon: Meena Quinn MD;  Location: [facility];  Service: ENT;  Laterality: Bilateral;    TYMPANIC MEMBRANE REPAIR Bilateral 10/6/2023     Procedure: MYRINGOPLASTY, PATCH;  Surgeon: Meena Quinn MD;  [place] [facility]  Service: [place];  Laterality: Bilateral;  with Biodesign         FAMILY HISTORY:         Family History   Problem Relation Age of Onset    No Known Problems Mother      No Known Problems Father           SOCIAL HISTORY:    Patient lives in [place], [place] with parents and brother. Their home is a single story house with 0 steps to enter.     MEDICATIONS: Reviewed in Epic     ALLERGIES:   Review of patient's allergies indicates:  No Known Allergies      REVIEW OF SYSTEMS: No constipation. Bowel movements are regular. No dysphagia. No weight, appetite or sleep concerns. No behavior concerns. No drooling or difficulty handling oral secretions. No G-tube. No skin lesions.      PHYSICAL EXAMINATION:   VITALS: Reviewed in Epic.   GENERAL: The patient is awake, alert, cooperative, smiling, playful and in no acute distress. Makes good eye contact on exam.   HEENT: Normocephalic, atraumatic. Pupils are equal, round and reactive to light bilaterally. Tracking is in all 4 quadrants. No facial asymmetry.   NECK: Supple. No lymphadenopathy. No masses. Full range of motion. No torticollis.   HEART: Regular rate and rhythm. No murmurs, rubs or gallops.   LUNGS: Clear to auscultation bilaterally. No crackles, rhonchi or wheezes.   ABDOMEN: Benign.   EXTREMITIES: No clubbing, cyanosis or edema.      MUSCULOSKELETAL: No focal muscular/limb atrophy/hypertrophy. No leg length discrepancy. Negative Galeazzi sign bilaterally. Thigh foot angles neutral bilaterally. No gross deformity.     NEUROMUSCULAR: (Full unless otherwise noted)       RIGHT   LEFT       R1 R2 R1 R2   Shoulder Abduction  Full   Full     Elbow Extension Full   Full     Wrist Extension Full   Full     Finger Extension Full   Full     Hip Abduction  55    55     Hip External Rotation  75   75     Hip Internal  Rotation  50    50     Knee Extension  full    full     Popliteal Angles  Full   Full     Ankle Dorsiflexion +5   +5        Modified Monica Scale: No spasticity or  hypotonicity on exam  4:  3:  2:  1+:  1:     Cranial nerves II-XII are grossly intact by observation. Manual muscle testing was unable to be performed secondary to reduced level of compliance related to the patient's age. Cerebellar testing was unable to be performed for the same reason. No dyskinetic or dystonic movements appreciated. There is symmetric withdraw to stimulus in all 4 extremities. Muscle stretch reflexes are 2+ throughout both upper and lower extremities. No clonus was elicited at either ankle. Toes are down going bilaterally.      GAIT/DYNAMIC:   Patient observed ambulating with and without AFO's in clinic. With AFO's on, patient exhibits consistent initial heel strike bilaterally and progression to foot flat and then toe off. Without AFO's on, pt with initial forefoot strike bilaterally without progression to foot flat. Pronation at both ankles. Flexible pes planus. External progression angle of 10 degrees bilaterally.         ASSESSMENT: Anatoly is a 3.5 y.o. male with a history of autism spectrum disorder presenting for evaluation of toe walking secondary to sensory integration disorder vs ASD. The following recommendations and plan were discussed in depth with their guardians who voiced understanding and were in agreement.      PLAN:   1. Spasticity: No evidence of spasticity -- normal tone throughout. ROM within normal limits and meeting gross motor milestones as appropriate. No loss of ADF range of motion since our last visit is reassuring.      2. Bracing: COnt with current articulated AFO's with dorsi-assist hinge during the day. Excellent gait pattern (essentially wnl -- when in these AFO's.     3. Equipment: no needs at this time     4. Bowel and bladder: Patient still diaper dependent; continue to work on potty training.      5. Therapy: COnt with current therapy regimen incl ELIU Tx.      6. Education: No needs at this time.      7. I would like to have Anatoly return to clinic in 3-4  months.      8. A copy of today's visit will be made available to William Lipscomb MD.         25 minutes of total time spent on the encounter, which includes face to face time and non-face to face time preparing to see the patient (eg, review of tests), obtaining and/or reviewing separately obtained history, documenting clinical information in the electronic or other health record, independently interpreting results (not separately reported) and communicating results to the patient/family/caregiver, or care coordination (not separately reported).

## 2024-05-12 LAB — NONINV COLON CA DNA+OCC BLD SCRN STL QL: NEGATIVE

## 2024-05-23 ENCOUNTER — HOSPITAL ENCOUNTER (OUTPATIENT)
Age: 44
Discharge: HOME | End: 2024-05-23
Payer: COMMERCIAL

## 2024-05-23 DIAGNOSIS — Z00.00 ANNUAL PHYSICAL EXAM: ICD-10-CM

## 2024-05-23 DIAGNOSIS — Z13.1 SCREENING FOR DIABETES MELLITUS: ICD-10-CM

## 2024-05-23 DIAGNOSIS — E78.2 MIXED HYPERLIPIDEMIA: ICD-10-CM

## 2024-05-23 LAB
ALBUMIN SERPL-MCNC: 4.3 G/DL (ref 3.4–5)
ALBUMIN/GLOB SERPL: 1.7 {RATIO} (ref 1.1–2.2)
ALP SERPL-CCNC: 66 U/L (ref 40–129)
ALT SERPL-CCNC: 27 U/L (ref 10–40)
ANION GAP SERPL CALCULATED.3IONS-SCNC: 12 MMOL/L (ref 3–16)
AST SERPL-CCNC: 23 U/L (ref 15–37)
BILIRUB SERPL-MCNC: 0.8 MG/DL (ref 0–1)
BUN SERPL-MCNC: 18 MG/DL (ref 7–20)
CALCIUM SERPL-MCNC: 9.8 MG/DL (ref 8.3–10.6)
CHLORIDE SERPL-SCNC: 105 MMOL/L (ref 99–110)
CHOLEST SERPL-MCNC: 139 MG/DL (ref 0–199)
CO2 SERPL-SCNC: 24 MMOL/L (ref 21–32)
CREAT SERPL-MCNC: 1.3 MG/DL (ref 0.9–1.3)
GFR SERPLBLD CREATININE-BSD FMLA CKD-EPI: 69 ML/MIN/{1.73_M2}
GLUCOSE P FAST SERPL-MCNC: 96 MG/DL (ref 70–99)
HDLC SERPL-MCNC: 42 MG/DL (ref 40–60)
LDL CHOLESTEROL: 75 MG/DL
POTASSIUM SERPL-SCNC: 4.6 MMOL/L (ref 3.5–5.1)
PROT SERPL-MCNC: 6.9 G/DL (ref 6.4–8.2)
SODIUM SERPL-SCNC: 141 MMOL/L (ref 136–145)
TRIGL SERPL-MCNC: 112 MG/DL (ref 0–150)
VLDLC SERPL CALC-MCNC: 22 MG/DL

## 2024-05-23 PROCEDURE — 80053 COMPREHEN METABOLIC PANEL: CPT

## 2024-05-23 PROCEDURE — 80061 LIPID PANEL: CPT

## 2024-05-23 PROCEDURE — 36415 COLL VENOUS BLD VENIPUNCTURE: CPT

## 2024-06-20 ENCOUNTER — TELEPHONE (OUTPATIENT)
Dept: FAMILY MEDICINE CLINIC | Age: 44
End: 2024-06-20

## 2024-06-20 DIAGNOSIS — J01.90 ACUTE SINUSITIS, RECURRENCE NOT SPECIFIED, UNSPECIFIED LOCATION: Primary | ICD-10-CM

## 2024-06-20 RX ORDER — PREDNISONE 10 MG/1
10 TABLET ORAL DAILY
Qty: 10 TABLET | Refills: 0 | Status: SHIPPED | OUTPATIENT
Start: 2024-06-20 | End: 2024-06-30

## 2024-06-20 NOTE — TELEPHONE ENCOUNTER
How many days has it been going on and what are his symptoms?  If less than 7-10 would consider a steroid to help with pressure/congestion, otherwise if longer can send an antibiotic.

## 2024-06-20 NOTE — TELEPHONE ENCOUNTER
Called and spoke with patient-  Symptoms started last Monday. Cough, extreme head congestion, headaches, facial pressure. Denies any fevers. Has been taking Mucinex and lavage.     Please send script to [facility] on [address].

## 2024-06-20 NOTE — TELEPHONE ENCOUNTER
Patient called and states he has a sinus infection. He states his wife is a nurse and she told him that's definitely what he has. He would like to know if provider could call him in a Zpack or another antibiotic to get rid of it.  Would prefer to not come in if at all possible.    Please advise    [id number] - [place] - [address] - P [phone] - F [phone] [address] [place] [address]  Phone: [phone]  Fax: [phone]

## 2024-10-08 ASSESSMENT — SLEEP AND FATIGUE QUESTIONNAIRES
HOW LIKELY ARE YOU TO NOD OFF OR FALL ASLEEP WHILE SITTING QUIETLY AFTER LUNCH WITHOUT ALCOHOL: WOULD NEVER DOZE
ESS TOTAL SCORE: 2
HOW LIKELY ARE YOU TO NOD OFF OR FALL ASLEEP WHILE SITTING AND TALKING TO SOMEONE: WOULD NEVER DOZE
HOW LIKELY ARE YOU TO NOD OFF OR FALL ASLEEP WHILE WATCHING TV: MODERATE CHANCE OF DOZING
HOW LIKELY ARE YOU TO NOD OFF OR FALL ASLEEP WHILE SITTING AND READING: WOULD NEVER DOZE
HOW LIKELY ARE YOU TO NOD OFF OR FALL ASLEEP IN A CAR, WHILE STOPPED FOR A FEW MINUTES IN TRAFFIC: WOULD NEVER DOZE
HOW LIKELY ARE YOU TO NOD OFF OR FALL ASLEEP WHILE SITTING AND READING: WOULD NEVER DOZE
HOW LIKELY ARE YOU TO NOD OFF OR FALL ASLEEP WHILE SITTING INACTIVE IN A PUBLIC PLACE: WOULD NEVER DOZE
HOW LIKELY ARE YOU TO NOD OFF OR FALL ASLEEP WHILE SITTING QUIETLY AFTER LUNCH WITHOUT ALCOHOL: WOULD NEVER DOZE
HOW LIKELY ARE YOU TO NOD OFF OR FALL ASLEEP WHEN YOU ARE A PASSENGER IN A CAR FOR AN HOUR WITHOUT A BREAK: WOULD NEVER DOZE
HOW LIKELY ARE YOU TO NOD OFF OR FALL ASLEEP WHILE LYING DOWN TO REST IN THE AFTERNOON WHEN CIRCUMSTANCES PERMIT: WOULD NEVER DOZE
HOW LIKELY ARE YOU TO NOD OFF OR FALL ASLEEP WHILE LYING DOWN TO REST IN THE AFTERNOON WHEN CIRCUMSTANCES PERMIT: WOULD NEVER DOZE
HOW LIKELY ARE YOU TO NOD OFF OR FALL ASLEEP WHILE SITTING AND TALKING TO SOMEONE: WOULD NEVER DOZE
HOW LIKELY ARE YOU TO NOD OFF OR FALL ASLEEP WHEN YOU ARE A PASSENGER IN A CAR FOR AN HOUR WITHOUT A BREAK: WOULD NEVER DOZE
HOW LIKELY ARE YOU TO NOD OFF OR FALL ASLEEP WHILE SITTING INACTIVE IN A PUBLIC PLACE: WOULD NEVER DOZE
HOW LIKELY ARE YOU TO NOD OFF OR FALL ASLEEP IN A CAR, WHILE STOPPED FOR A FEW MINUTES IN TRAFFIC: WOULD NEVER DOZE
HOW LIKELY ARE YOU TO NOD OFF OR FALL ASLEEP WHILE WATCHING TV: MODERATE CHANCE OF DOZING

## 2024-10-09 ENCOUNTER — OFFICE VISIT (OUTPATIENT)
Dept: PULMONOLOGY | Age: 44
End: 2024-10-09
Payer: COMMERCIAL

## 2024-10-09 VITALS
SYSTOLIC BLOOD PRESSURE: 114 MMHG | DIASTOLIC BLOOD PRESSURE: 78 MMHG | OXYGEN SATURATION: 95 % | BODY MASS INDEX: 32.2 KG/M2 | HEIGHT: 73 IN | WEIGHT: 243 LBS | HEART RATE: 78 BPM

## 2024-10-09 DIAGNOSIS — R06.81 WITNESSED EPISODE OF APNEA: ICD-10-CM

## 2024-10-09 DIAGNOSIS — G47.10 HYPERSOMNIA: Primary | ICD-10-CM

## 2024-10-09 DIAGNOSIS — R06.83 SNORING: ICD-10-CM

## 2024-10-09 PROCEDURE — 99204 OFFICE O/P NEW MOD 45 MIN: CPT | Performed by: STUDENT IN AN ORGANIZED HEALTH CARE EDUCATION/TRAINING PROGRAM

## 2024-10-09 NOTE — PROGRESS NOTES
[facility]  Sleep Medicine  [facility], [address]  [place] [address]    Chief Complaint   Patient presents with    Snoring       Declan Rowe is a 44 y.o. male who comes in for sleep evaluation.  His complaints include loud snoring and witnessed pauses in breathing during sleep. Onset of symptoms was a few years ago.     Pertinent PMHx includes: anxiety and HLD.    He goes to bed at 9-10pm. He falls asleep in  few minutes.  He awakens 1-2 times per night. He awakens at around 5am. The average total amount of sleep is about 7 hours per night. He does not use sleep aid/s but every once in a while he takes melatonin. He does take daytime naps on weekends. He describes the symptoms as loud snoring, witnessed apneas, and morning dry mouth.  He does not have symptoms consistent / suggestive of restless legs syndrome. He has not dozed off while driving. He denies recent significant weight gain. Previous evaluation and treatment has included: none.    Family hx of sleep disorders:  father with LOVE  Caffeinated drinks/day: multiple pepsi zeros a day   Alcohol intake/day/week: it varies: a few drinks per week  Occupation:        DATA REVIEWED TODAY:  [place] & Insomnia Severity Index scores      10/8/2024     4:12 PM   Sleep Medicine   Sitting and reading 0   Watching TV 2   Sitting, inactive in a public place (e.g. a theatre or a meeting) 0   As a passenger in a car for an hour without a break 0   Lying down to rest in the afternoon when circumstances permit 0   Sitting and talking to someone 0   Sitting quietly after a lunch without alcohol 0   In a car, while stopped for a few minutes in traffic 0   [place] Sleepiness Score 2   Neck (Inches) 18         10/8/2024     4:14 PM   Insomnia Severity Index (COV)   Difficulty falling asleep 1   Difficulty staying asleep 1   Problems waking up too early 1   How satisfied/dissatisfied are you with your CURRENT sleep pattern? 1   How

## 2024-11-05 ENCOUNTER — HOSPITAL ENCOUNTER (OUTPATIENT)
Dept: SLEEP CENTER | Age: 44
Discharge: HOME | End: 2024-11-05
Payer: COMMERCIAL

## 2024-11-05 DIAGNOSIS — R06.81 WITNESSED EPISODE OF APNEA: ICD-10-CM

## 2024-11-05 DIAGNOSIS — G47.10 HYPERSOMNIA: ICD-10-CM

## 2024-11-05 DIAGNOSIS — R06.83 SNORING: ICD-10-CM

## 2024-11-05 PROCEDURE — 95806 SLEEP STUDY UNATT&RESP EFFT: CPT

## 2024-11-07 ENCOUNTER — TELEPHONE (OUTPATIENT)
Dept: PULMONOLOGY | Age: 44
End: 2024-11-07

## 2024-11-07 NOTE — TELEPHONE ENCOUNTER
Please inform patient that his sleep study showed moderate sleep apnea and that he stopped breathing or his breathing was inadequate about 21 times for every hour of sleep. His blood oxygen also dropped as low as 84% during the night.     If he agrees I will order a CPAP via a durable medical equipment company of their choice (if no preference, we will go with [facility], based on location) and they will reach out to keep him updated on the process. This will be the company that is going to work with him insurance and provide the CPAP device, along with replacing the mask and other supplies going forward. If they have any questions, they can let you know or message me via [employer]. If patient agrees with plan, please route the PAP order to DME company (order already completed on a separate order encounter). Patient should be scheduled for 31 to 90 days follow up.    Thanks  Barry Thapa MD

## 2024-11-07 NOTE — PROGRESS NOTES
Ordering Provider:   Barry Thapa MD - Diplomate, [facility] Sleep Medicine  [address] [place] [address], [place] [address]    Phone [phone]  Fax [phone]    Diagnosis: [x] LOVE  (G47.33) [] CSA (G47.31) []  Other:__________________   Length of Need: [] 13 months [x]  99 Months                                          []  Other:__________________   Machine (FABIOLA): [] Respironics Auto (with modem for remote monitoring)       [] Other:____________________    [x]  ResMed Auto (with modem for remote monitoring)    [x]  CPAP () [] Bilevel ()   Mode: Mode:   [x] Auto [] Fixed [] Auto [] Spontaneous   Pmin:_____5____cmH2O      Pmax:_____15____cmH2O   P:_________cmH2O    EPAPmin:__________cmH2O IPAP:__________cmH2O     IPAPmax:__________cmH2O EPAP:__________cmH2O     PSmin:_______  PSmax:_______       (ResMed) PS:_________     Flex/EPR - 3 full time                          Ramp time: 30 min Flex/EPR - 3 full time                 Ramp time: 30 min   Ramp Pressure:_____4______cmH2O Ramp Pressure:____________cmH2O         Humidifier: [x] Heated ()                                               [] Passive     [x] Water chamber replacement ()/ 1 per 6 months   Mask:   [x] Nasal () /1 per 3 months [] Full Face () /1 per 3 months   [x] Patient choice -Size and fit mask [] Patient Choice - Size and fit mask   [x] Dispense: nasal cushion  [] Dispense:    [] Dispense:    [x] Headgear () / 1 per 3 months [] Headgear () / 1 per 3 months   [x] Replacement Nasal Cushion ()/2 per month [] Interface Replacement ()/1 per month   [] Replacement Nasal Pillows ()/2 per month       Tubing: [x] Heated ()/1 per 3 months                           [] Standard ()/1 per 3 months  [] Other:____________________   Filters: [x] Non-disposable ()/1 per 6 months                 [x] Ultra-Fine, Disposable ()/2 per month     Miscellaneous: [x]

## 2024-11-10 PROBLEM — G47.10 HYPERSOMNIA: Status: ACTIVE | Noted: 2024-11-10

## 2024-11-11 NOTE — TELEPHONE ENCOUNTER
Spoke with pt to review HST results.  Order to be sent to [facility]. Call transferred for pt to schedule f/u.

## 2025-01-24 ENCOUNTER — OFFICE VISIT (OUTPATIENT)
Dept: PULMONOLOGY | Age: 45
End: 2025-01-24
Payer: COMMERCIAL

## 2025-01-24 VITALS
BODY MASS INDEX: 32.71 KG/M2 | SYSTOLIC BLOOD PRESSURE: 124 MMHG | DIASTOLIC BLOOD PRESSURE: 78 MMHG | HEART RATE: 82 BPM | HEIGHT: 73 IN | WEIGHT: 246.8 LBS | OXYGEN SATURATION: 97 %

## 2025-01-24 DIAGNOSIS — G47.33 OSA ON CPAP: Primary | ICD-10-CM

## 2025-01-24 PROCEDURE — 99213 OFFICE O/P EST LOW 20 MIN: CPT | Performed by: STUDENT IN AN ORGANIZED HEALTH CARE EDUCATION/TRAINING PROGRAM

## 2025-01-24 ASSESSMENT — SLEEP AND FATIGUE QUESTIONNAIRES
HOW LIKELY ARE YOU TO NOD OFF OR FALL ASLEEP WHILE LYING DOWN TO REST IN THE AFTERNOON WHEN CIRCUMSTANCES PERMIT: SLIGHT CHANCE OF DOZING
HOW LIKELY ARE YOU TO NOD OFF OR FALL ASLEEP IN A CAR, WHILE STOPPED FOR A FEW MINUTES IN TRAFFIC: WOULD NEVER DOZE
HOW LIKELY ARE YOU TO NOD OFF OR FALL ASLEEP WHILE LYING DOWN TO REST IN THE AFTERNOON WHEN CIRCUMSTANCES PERMIT: SLIGHT CHANCE OF DOZING
HOW LIKELY ARE YOU TO NOD OFF OR FALL ASLEEP WHILE SITTING QUIETLY AFTER LUNCH WITHOUT ALCOHOL: WOULD NEVER DOZE
HOW LIKELY ARE YOU TO NOD OFF OR FALL ASLEEP WHILE SITTING QUIETLY AFTER LUNCH WITHOUT ALCOHOL: WOULD NEVER DOZE
HOW LIKELY ARE YOU TO NOD OFF OR FALL ASLEEP WHILE SITTING AND READING: WOULD NEVER DOZE
HOW LIKELY ARE YOU TO NOD OFF OR FALL ASLEEP WHILE SITTING AND TALKING TO SOMEONE: WOULD NEVER DOZE
HOW LIKELY ARE YOU TO NOD OFF OR FALL ASLEEP WHILE SITTING AND READING: WOULD NEVER DOZE
ESS TOTAL SCORE: 2
HOW LIKELY ARE YOU TO NOD OFF OR FALL ASLEEP IN A CAR, WHILE STOPPED FOR A FEW MINUTES IN TRAFFIC: WOULD NEVER DOZE
HOW LIKELY ARE YOU TO NOD OFF OR FALL ASLEEP WHEN YOU ARE A PASSENGER IN A CAR FOR AN HOUR WITHOUT A BREAK: WOULD NEVER DOZE
HOW LIKELY ARE YOU TO NOD OFF OR FALL ASLEEP WHEN YOU ARE A PASSENGER IN A CAR FOR AN HOUR WITHOUT A BREAK: WOULD NEVER DOZE
HOW LIKELY ARE YOU TO NOD OFF OR FALL ASLEEP WHILE SITTING INACTIVE IN A PUBLIC PLACE: WOULD NEVER DOZE
HOW LIKELY ARE YOU TO NOD OFF OR FALL ASLEEP WHILE SITTING AND TALKING TO SOMEONE: WOULD NEVER DOZE
HOW LIKELY ARE YOU TO NOD OFF OR FALL ASLEEP WHILE SITTING INACTIVE IN A PUBLIC PLACE: WOULD NEVER DOZE
HOW LIKELY ARE YOU TO NOD OFF OR FALL ASLEEP WHILE WATCHING TV: SLIGHT CHANCE OF DOZING
HOW LIKELY ARE YOU TO NOD OFF OR FALL ASLEEP WHILE WATCHING TV: SLIGHT CHANCE OF DOZING

## 2025-01-24 NOTE — PROGRESS NOTES
[facility]  Sleep Medicine  [address] [place] [address], [address]  [place] [address]    Chief Complaint   Patient presents with    Sleep Apnea         Declan Rowe comes in today for sleep apnea follow-up . He was diagnosed with moderate obstructive sleep apnea and is being treated with PAP therapy.   He has been on PAP therapy for 1 month.  He states he wears the PAP device some nights. He stopped using it because he cannot tolerate it. He is working on lifestyle modification to lose weight.    He falls asleep in  few minutes.  He awakens 1 times per night. The average total amount of sleep is about 8 hours per night and takes naps at times. He does not use sleep aid/s (melatonin on rare occasion). Symptoms of sleep apnea have not improved since using PAP therapy.  He continues to have complaints of snoring.   DME: [employer]  Mask:   Machine: ResMed Airsense 11 Autoset      DATA REVIEWED TODAY:    Diagnostic Review  HST on 2024 showed respiratory event index of 20.7/h with oxygen desaturation down to a claudio of 84%.     [place]           2025    10:33 AM 10/8/2024     4:12 PM   Sleep Medicine   Sitting and reading 0 0   Watching TV 1 2   Sitting, inactive in a public place (e.g. a theatre or a meeting) 0 0   As a passenger in a car for an hour without a break 0 0   Lying down to rest in the afternoon when circumstances permit 1 0   Sitting and talking to someone 0 0   Sitting quietly after a lunch without alcohol 0 0   In a car, while stopped for a few minutes in traffic 0 0   [place] Sleepiness Score 2 2   Neck (Inches)  18       PAP Adherence (dates: 2024 - 2025)  Total days used: 10/  % used days >= 4 hours: 0%  Average hours used per day: 1 hrs 15 mins  Mode: auto CPAP  Pressure settin-10 cmH2O  Average pressure (95%): 6.7 cmH2O  Leaks (95%): 5.5 L/min  Residual AHI: 1.1      Medications   He has a current medication list which includes the following

## 2025-02-21 DIAGNOSIS — F41.9 ANXIETY: ICD-10-CM

## 2025-02-21 DIAGNOSIS — E78.2 MIXED HYPERLIPIDEMIA: ICD-10-CM

## 2025-02-21 RX ORDER — ATORVASTATIN CALCIUM 20 MG/1
20 TABLET, FILM COATED ORAL DAILY
Qty: 90 TABLET | Refills: 0 | Status: SHIPPED | OUTPATIENT
Start: 2025-02-21

## 2025-05-20 DIAGNOSIS — E78.2 MIXED HYPERLIPIDEMIA: ICD-10-CM

## 2025-05-20 DIAGNOSIS — F41.9 ANXIETY: ICD-10-CM

## 2025-05-20 RX ORDER — ATORVASTATIN CALCIUM 20 MG/1
20 TABLET, FILM COATED ORAL DAILY
Qty: 90 TABLET | Refills: 0 | OUTPATIENT
Start: 2025-05-20

## 2025-07-24 ENCOUNTER — OFFICE VISIT (OUTPATIENT)
Dept: PULMONOLOGY | Age: 45
End: 2025-07-24
Payer: COMMERCIAL

## 2025-07-24 VITALS
SYSTOLIC BLOOD PRESSURE: 124 MMHG | HEIGHT: 73 IN | BODY MASS INDEX: 32.63 KG/M2 | HEART RATE: 70 BPM | OXYGEN SATURATION: 98 % | DIASTOLIC BLOOD PRESSURE: 76 MMHG | WEIGHT: 246.2 LBS

## 2025-07-24 DIAGNOSIS — G47.33 OSA (OBSTRUCTIVE SLEEP APNEA): Primary | ICD-10-CM

## 2025-07-24 PROCEDURE — 99213 OFFICE O/P EST LOW 20 MIN: CPT | Performed by: STUDENT IN AN ORGANIZED HEALTH CARE EDUCATION/TRAINING PROGRAM

## 2025-07-24 ASSESSMENT — SLEEP AND FATIGUE QUESTIONNAIRES
HOW LIKELY ARE YOU TO NOD OFF OR FALL ASLEEP WHILE SITTING AND READING: WOULD NEVER DOZE
HOW LIKELY ARE YOU TO NOD OFF OR FALL ASLEEP WHILE SITTING INACTIVE IN A PUBLIC PLACE: SLIGHT CHANCE OF DOZING
HOW LIKELY ARE YOU TO NOD OFF OR FALL ASLEEP WHILE WATCHING TV: SLIGHT CHANCE OF DOZING
HOW LIKELY ARE YOU TO NOD OFF OR FALL ASLEEP WHILE LYING DOWN TO REST IN THE AFTERNOON WHEN CIRCUMSTANCES PERMIT: MODERATE CHANCE OF DOZING
HOW LIKELY ARE YOU TO NOD OFF OR FALL ASLEEP WHILE SITTING QUIETLY AFTER LUNCH WITHOUT ALCOHOL: WOULD NEVER DOZE
HOW LIKELY ARE YOU TO NOD OFF OR FALL ASLEEP WHILE SITTING AND TALKING TO SOMEONE: WOULD NEVER DOZE
ESS TOTAL SCORE: 4
HOW LIKELY ARE YOU TO NOD OFF OR FALL ASLEEP WHEN YOU ARE A PASSENGER IN A CAR FOR AN HOUR WITHOUT A BREAK: WOULD NEVER DOZE
HOW LIKELY ARE YOU TO NOD OFF OR FALL ASLEEP IN A CAR, WHILE STOPPED FOR A FEW MINUTES IN TRAFFIC: WOULD NEVER DOZE

## 2025-07-24 NOTE — PROGRESS NOTES
[facility]  Sleep Medicine  [facility] [facility], [address]  [place] [address]    Chief Complaint   Patient presents with    Sleep Apnea     \"Sent machine back, hated it\"         Declan Rowe comes in today for sleep apnea follow-up . He was diagnosed with moderate obstructive sleep apnea and was being treated with PAP therapy.   He quit using his CPAP and return to device to [facility].  Today he reports he couldn't tolerate it, though he tried.    He falls asleep in few minutes.  He awakens 1 times per night. The average total amount of sleep is about 8 hours per night and takes naps at times. He does not use sleep aid/s . He continues to have complaints of loud snoring and witnessed apneas.        DATA REVIEWED TODAY:    Diagnostic Review  HST on 11/6/2024 showed respiratory event index of 20.7/h with oxygen desaturation down to a claudio of 84%.     [place]           7/24/2025    10:09 AM 1/24/2025    10:33 AM 10/8/2024     4:12 PM   Sleep Medicine   Sitting and reading 0 0 0   Watching TV 1 1 2   Sitting, inactive in a public place (e.g. a theatre or a meeting) 1 0 0   As a passenger in a car for an hour without a break 0 0 0   Lying down to rest in the afternoon when circumstances permit 2 1 0   Sitting and talking to someone 0 0 0   Sitting quietly after a lunch without alcohol 0 0 0   In a car, while stopped for a few minutes in traffic 0 0 0   [place] Sleepiness Score 4 2  2    Neck (Inches)   18       Patient-reported           Medications   He has a current medication list which includes the following prescription(s): atorvastatin and fluoxetine.    Review of Systems  A complete review of systems was negative except that noted in HPI    Physical Exam  Vitals:    07/24/25 1009   BP: 124/76   Pulse: 70   SpO2: 98%         Weight BMI   Wt Readings from Last 5 Encounters:   07/24/25 111.7 kg (246 lb 3.2 oz)   01/24/25 111.9 kg (246 lb 12.8 oz)   10/09/24 110.2 kg (243 lb)   03/08/24 109.8 kg (242 lb)